# Patient Record
(demographics unavailable — no encounter records)

---

## 2025-05-03 NOTE — ASSESSMENT
[FreeTextEntry1] : 3-year-old female who comes accompanied by her mother who presents valuation status post an injury to her right lower extremity.  Patient's mother reports the patient was at the [employer] gym when she fell from a height.  [facility] where [facility] were taken which confirmed an oblique linear lucency of the distal tibia/tibial shaft.  She was placed in a splint and advised follow-up in orthopedic specialist.  Physical examination of the right lower extremity: Mild swelling appreciated over the shin.  No bruising.  Erythema.  Tender palpation over the tibial shaft/distal tibia.  Full range of motion of the ankle foot and knee.  Nonantalgic gait.  Sensation intact distally.  Neuro vas intact.  Calf soft nontender.  No signs of infection.  X-rays of the right tib-fib reviewed from [facility] reveal acute closed nondisplaced oblique/linear lucency through the tibial shaft/distal tibia.  No subluxations or dislocations.  TX: X-rays were discussed in depth.  I recommended a long-leg cast.  Under the patient's mother's consent, the patient was placed in a well molded well-fitting long leg fiberglass cast in the office today.  Cast hygiene was discussed.  Red flag symptoms were discussed.  Tolerated casting procedure well.  Patient understands fractures take a total of 4 to 6 weeks to fully heal.  We discussed operative versus nonoperative management.  Red flag symptoms were discussed.  I will provide a follow-up in 10 to 14 days for repeat x-rays and further evaluation/treatment.  Recommended Tylenol/Motrin as needed for pain.  Confirmed no contraindications. All questions and concerns addressed to patient's satisfaction. Patient expresses full understanding of treatment plan.

## 2025-05-20 NOTE — ASSESSMENT
[FreeTextEntry1] : Will continue in the cast. Patient's mom would like to get the cast off [place]. Patient will follow up with LIYA Palmer this Friday, if x-ray looks good then can switch to cam boot. Follow up with me in 4 months for physeal check.

## 2025-05-20 NOTE — DATA REVIEWED
[Acceptable Fracture Allignment] : fracture alignment remains acceptable [de-identified] :  2 views of right tibia and fibula reviewed.

## 2025-05-20 NOTE — HISTORY OF PRESENT ILLNESS
[FreeTextEntry1] : 3 y/o female here with left distal tibia fracture after falling at a jungle gym, about 10 days ago. Patient was seen at [facility] and x-rays confirmed an area of lucency of the distal tibia. Patient was last seen by LIYA Moy and placed in a cast.

## 2025-05-20 NOTE — HISTORY OF PRESENT ILLNESS
[FreeTextEntry1] : 3 y/o female here with left distal tibia fracture after falling at a jungle gym, about 10 days ago. Patient was seen at [facility] and x-rays confirmed an area of lucency of the distal tibia. Patient was last seen by LIYA Moy and placed in a cast.

## 2025-05-20 NOTE — DATA REVIEWED
[Acceptable Fracture Allignment] : fracture alignment remains acceptable [de-identified] :  2 views of right tibia and fibula reviewed.

## 2025-07-01 NOTE — HISTORY OF PRESENT ILLNESS
[FreeTextEntry1] : 3 y/o female here with right distal tibia fracture. Had cast taken off in [place] back in May.

## 2025-07-01 NOTE — DATA REVIEWED
[Acceptable Fracture Allignment] : fracture alignment remains acceptable [de-identified] :  2 views of right tibia and fibula reviewed.

## 2025-07-01 NOTE — ASSESSMENT
[FreeTextEntry1] : Fully healed. Physis is open. Medications will be sent to the pharmacy. Follow up in 2-3 months, no x-rays needed at that time.
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
